# Patient Record
Sex: FEMALE
[De-identification: names, ages, dates, MRNs, and addresses within clinical notes are randomized per-mention and may not be internally consistent; named-entity substitution may affect disease eponyms.]

---

## 2023-08-15 ENCOUNTER — APPOINTMENT (OUTPATIENT)
Dept: CARE COORDINATION | Facility: HOME HEALTH | Age: 73
End: 2023-08-15
Payer: MEDICARE

## 2023-08-15 VITALS — RESPIRATION RATE: 13 BRPM | BODY MASS INDEX: 29.59 KG/M2 | WEIGHT: 167 LBS | HEIGHT: 63 IN

## 2023-08-15 DIAGNOSIS — I73.9 PERIPHERAL VASCULAR DISEASE, UNSPECIFIED: ICD-10-CM

## 2023-08-15 DIAGNOSIS — Z85.3 PERSONAL HISTORY OF MALIGNANT NEOPLASM OF BREAST: ICD-10-CM

## 2023-08-15 DIAGNOSIS — M06.9 RHEUMATOID ARTHRITIS, UNSPECIFIED: ICD-10-CM

## 2023-08-15 PROBLEM — Z00.00 ENCOUNTER FOR PREVENTIVE HEALTH EXAMINATION: Status: ACTIVE | Noted: 2023-08-15

## 2023-08-15 PROCEDURE — 99344 HOME/RES VST NEW MOD MDM 60: CPT | Mod: 95,25

## 2023-08-15 PROCEDURE — G0447 BEHAVIOR COUNSEL OBESITY 15M: CPT | Mod: 59

## 2023-08-15 RX ORDER — BUSPIRONE HYDROCHLORIDE 5 MG/1
5 TABLET ORAL
Refills: 0 | Status: ACTIVE | COMMUNITY

## 2023-08-15 RX ORDER — LETROZOLE TABLETS 2.5 MG/1
2.5 TABLET, FILM COATED ORAL
Refills: 0 | Status: ACTIVE | COMMUNITY

## 2023-08-15 RX ORDER — ALBUTEROL 90 MCG
90 AEROSOL (GRAM) INHALATION
Refills: 0 | Status: ACTIVE | COMMUNITY

## 2023-08-15 RX ORDER — ROSUVASTATIN CALCIUM 5 MG/1
5 TABLET, FILM COATED ORAL
Refills: 0 | Status: ACTIVE | COMMUNITY

## 2023-08-15 RX ORDER — FAMOTIDINE 20 MG/1
20 TABLET, FILM COATED ORAL
Refills: 0 | Status: ACTIVE | COMMUNITY

## 2023-08-15 RX ORDER — LEVOTHYROXINE SODIUM 0.03 MG/1
25 TABLET ORAL
Refills: 0 | Status: ACTIVE | COMMUNITY

## 2023-08-15 RX ORDER — METOPROLOL SUCCINATE 50 MG/1
50 TABLET, EXTENDED RELEASE ORAL
Refills: 0 | Status: ACTIVE | COMMUNITY

## 2023-08-15 RX ORDER — TELMISARTAN 40 MG/1
40 TABLET ORAL
Refills: 0 | Status: ACTIVE | COMMUNITY

## 2023-08-15 NOTE — PLAN
[FreeTextEntry1] : Patient seen in home, enforced w/ pt the need for daily weight/bp monitoring, low salt diet/fat and educated pt to avoid processed/canned food and limit take out, increase vegetable/fiber and fruit intake (portion control).  Daily exercise w/ easy to reach realistic goals.  Continue w/ current regimen and medication as ordered.  Adhere to follow up w/ pcp and referral to neurology for migraine, otolaryngology for left ear tinnitus and PCP for dexa scan  . Pt advised to call with any questions/concerns

## 2023-08-15 NOTE — HISTORY OF PRESENT ILLNESS
[Other: _____] : [unfilled] [FreeTextEntry1] : This visit was provided via telehealth using real-time 2-way audio visual technology. The patient, VIVIANA WHALEY was located at home, [address] [place] [address] , at the time of the visit. The provider, Vishal HERNANDEZ, was located at [facility] at the time of the visit.  The patient, VIVIANA WHALEY and provider participated in the telehealth encounter.  Verbal consent for telehealth services was given at time prior to the start of visit. [de-identified] : HFI. This is VIVIANA WHALEY 72 year English, F, presented for virtual visit as stated above.  Recently reported vitals in flow sheet. Medication reconciliation performed.  The patient reported h/o: right breast cancer 1990 s/p lumpectomy then recurrent in 2018 w/ mastectomy  HTN, HLD, RA, GERD, fatty liver, claudication of BLE, depression, tinnitus of left ear, anxiety, migraine and hypothyroid.      VIVIANA WHALEY, is seen via telecommunication as stated above, appears pleasant and in nad.  HHA is present. Has active home care services, w/ HHA scheduled: 25hrs a week. Patient denies any feeling of acute exacerbation of depression, and HI/SI. Patient denies any f/c, sob, cp, dizziness, lightheadedness, abnormal bruising/bleeding, n/v/d, or abdominal pain.

## 2023-08-15 NOTE — PHYSICAL EXAM
[Normal Sclera/Conjunctiva] : normal sclera/conjunctiva [Normal Outer Ear/Nose] : the outer ears and nose were normal in appearance [Supple] : supple [No Respiratory Distress] : no respiratory distress  [No Accessory Muscle Use] : no accessory muscle use [Soft] : abdomen soft [Non Tender] : non-tender [Non-distended] : non-distended [Speech Grossly Normal] : speech grossly normal [Memory Grossly Normal] : memory grossly normal [Alert and Oriented x3] : oriented to person, place, and time [Normal Mood] : the mood was normal [Normal] : affect was normal and insight and judgment were intact [de-identified] : bilateral hand w/ joint pain [de-identified] : anxious

## 2023-08-15 NOTE — HEALTH RISK ASSESSMENT
[Fair] :  ~his/her~ mood as fair [No] : No [No falls in past year] : Patient reported no falls in the past year [Little interest or pleasure doing things] : 1) Little interest or pleasure doing things [2] : 1) Little interest or pleasure doing things for more than half of the days (2) [Feeling down, depressed, or hopeless] : 2) Feeling down, depressed, or hopeless [PHQ-2 Negative - No further assessment needed] : PHQ-2 Negative - No further assessment needed [1/2 of Days or More (2)] : 1.) Little interest or pleasure in doing things? Half the days or more [Several Days (1)] : 7.) Trouble concentrating on things, such as reading a newspaper or watching television? Several days [Not at All (0)] : 8.) Moving or speaking so slowly that other people could have noticed, or the opposite, moving or speaking faster than usual? Not at all [Not at all] : How difficult have these problems made it for you to do your work, take care of things at home, or get along with people? Not at all [I have developed a follow-up plan documented below in the note.] : I have developed a follow-up plan documented below in the note. [Patient reported mammogram was normal] : Patient reported mammogram was normal [Alone] : lives alone [Retired] : retired [Feels Safe at Home] : Feels safe at home [Reports changes in vision] : Reports changes in vision [Reports normal functional visual acuity (ie: able to read med bottle)] : Reports normal functional visual acuity [Smoke Detector] : smoke detector [Carbon Monoxide Detector] : carbon monoxide detector [With Patient/Caregiver] : , with patient/caregiver [Reviewed no changes] : Reviewed, no changes [Designated Healthcare Proxy] : Designated healthcare proxy [Name: ___] : Health Care Proxy's Name: [unfilled]  [Relationship: ___] : Relationship: [unfilled] [I will adhere to the patient's wishes.] : I will adhere to the patient's wishes. [Time Spent: ___ minutes] : Time Spent: [unfilled] minutes [Former] : Former [20 or more] : 20 or more [> 15 Years] : > 15 Years [Audit-CScore] : 0 [[facility] [[id number]] : 2 [[id number]] : 4 [Change in mental status noted] : No change in mental status noted [Language] : denies difficulty with language [Behavior] : denies difficulty with behavior [Learning/Retaining New Information] : denies difficulty learning/retaining new information [Handling Complex Tasks] : denies difficulty handling complex tasks [Reasoning] : denies difficulty with reasoning [Spatial Ability and Orientation] : denies difficulty with spatial ability and orientation [Sexually Active] : not sexually active [Reports changes in hearing] : Reports no changes in hearing [Reports changes in dental health] : Reports no changes in dental health [MammogramDate] : 10/22 [ColonoscopyComments] : upcoming appointment 10/2023 [de-identified] : w/ assist from [facility] [de-identified] : w/ assist from [facility] [de-identified] : ringing in ear left [de-identified] : w/ glasses [AdvancecareDate] : 08/23 [FreeTextEntry4] : HCP completed, content not disclosed

## 2023-08-15 NOTE — COUNSELING
[Fall prevention counseling provided] : Fall prevention counseling provided [Adequate lighting] : Adequate lighting [No throw rugs] : No throw rugs [Use proper foot wear] : Use proper foot wear [Use recommended devices] : Use recommended devices [Behavioral health counseling provided] : Behavioral health counseling provided [Sleep ___ hours/day] : Sleep [unfilled] hours/day [Engage in a relaxing activity] : Engage in a relaxing activity [Plan in advance] : Plan in advance [No] : Risk of tobacco use and health benefits of smoking cessation discussed: No [Potential consequences of obesity discussed] : Potential consequences of obesity discussed [Benefits of weight loss discussed] : Benefits of weight loss discussed [Encouraged to maintain food diary] : Encouraged to maintain food diary [Encouraged to increase physical activity] : Encouraged to increase physical activity [Encouraged to use exercise tracking device] : Encouraged to use exercise tracking device [Weigh Self Weekly] : weigh self weekly [Decrease Portions] : decrease portions [Keep Food Diary] : keep food diary [Needs reinforcement, provided] : Patient needs reinforcement on understanding of disease, goals and obesity follow-up plan; reinforcement was provided [FreeTextEntry4] : 15

## 2023-08-15 NOTE — REVIEW OF SYSTEMS
[Leg Claudication] : leg claudication [Dyspnea on Exertion] : dyspnea on exertion [Incontinence] : incontinence [Joint Pain] : joint pain [Joint Stiffness] : joint stiffness [Joint Swelling] : joint swelling [Negative] : Psychiatric [Chest Pain] : no chest pain [Orthopnea] : no orthopnea [FreeTextEntry8] : stress incontence [FreeTextEntry9] : bilateral hands